# Patient Record
Sex: FEMALE | Race: WHITE | NOT HISPANIC OR LATINO | Employment: OTHER | URBAN - METROPOLITAN AREA
[De-identification: names, ages, dates, MRNs, and addresses within clinical notes are randomized per-mention and may not be internally consistent; named-entity substitution may affect disease eponyms.]

---

## 2018-10-05 ENCOUNTER — OFFICE VISIT (OUTPATIENT)
Dept: URGENT CARE | Facility: URGENT CARE | Age: 83
End: 2018-10-05
Payer: MEDICARE

## 2018-10-05 VITALS
HEART RATE: 79 BPM | WEIGHT: 168.9 LBS | OXYGEN SATURATION: 95 % | RESPIRATION RATE: 16 BRPM | SYSTOLIC BLOOD PRESSURE: 170 MMHG | TEMPERATURE: 97.7 F | DIASTOLIC BLOOD PRESSURE: 88 MMHG

## 2018-10-05 DIAGNOSIS — S69.91XA THUMB INJURY, RIGHT, INITIAL ENCOUNTER: Primary | ICD-10-CM

## 2018-10-05 PROCEDURE — 99203 OFFICE O/P NEW LOW 30 MIN: CPT | Performed by: NURSE PRACTITIONER

## 2018-10-05 RX ORDER — MULTIPLE VITAMINS W/ MINERALS TAB 9MG-400MCG
1 TAB ORAL DAILY
COMMUNITY

## 2018-10-05 ASSESSMENT — ENCOUNTER SYMPTOMS
TINGLING: 0
BRUISES/BLEEDS EASILY: 1
FALLS: 1

## 2018-10-05 NOTE — MR AVS SNAPSHOT
After Visit Summary   10/5/2018    Preethi Perkins    MRN: [id number]           Patient Information     Date Of Birth          1/20/1934        Visit Information        Provider Department      10/5/2018 5:10 PM Joselin Simmons NP [facility]        Today's Diagnoses     Thumb injury, right, initial encounter    -  1       Follow-ups after your visit        Who to contact     If you have questions or need follow up information about today's clinic visit or your schedule please contact [facility] directly at [phone].  Normal or non-critical lab and imaging results will be communicated to you by MyChart, letter or phone within 4 business days after the clinic has received the results. If you do not hear from us within 7 days, please contact the clinic through MyChart or phone. If you have a critical or abnormal lab result, we will notify you by phone as soon as possible.  Submit refill requests through [employer] or call your pharmacy and they will forward the refill request to us. Please allow 3 business days for your refill to be completed.          Additional Information About Your Visit        Care EveryWhere ID     This is your Care EveryWhere ID. This could be used by other organizations to access your [facility] medical records  [id number]        Your Vitals Were     Pulse Temperature Respirations Pulse Oximetry          79 97.7  F (36.5  C) (Oral) 16 95%         Blood Pressure from Last 3 Encounters:   10/05/18 170/88    Weight from Last 3 Encounters:   10/05/18 168 lb 14.4 oz (76.6 kg)              Today, you had the following     No orders found for display       Primary Care Provider Office Phone # Fax #    Rachel Wilkerson [phone]       [facility] [place] [address]  [place] [address]        Equal Access to Services     HUI BAUER AH: rosalia Roman,  delfin carrollalbubba ramoslakeisha micheal kd harris ah. So [facility] [phone].    ATENCIÓN: Si habla jonelle, tiene a mckenzie disposición servicios gratuitos de asistencia lingüística. Ilir al [phone].    We comply with applicable federal civil rights laws and [place] laws. We do not discriminate on the basis of race, color, national origin, age, disability, sex, sexual orientation, or gender identity.            Thank you!     Thank you for choosing [facility]  for your care. Our goal is always to provide you with excellent care. Hearing back from our patients is one way we can continue to improve our services. Please take a few minutes to complete the written survey that you may receive in the mail after your visit with us. Thank you!             Your Updated Medication List - Protect others around you: Learn how to safely use, store and throw away your medicines at www.disposemymeds.org.          This list is accurate as of 10/5/18  6:27 PM.  Always use your most recent med list.                   Brand Name Dispense Instructions for use Diagnosis    ASPIRIN PO           ATORVASTATIN CALCIUM PO           CO Q 10 PO           COZAAR PO           LASIX PO           Multi-vitamin Tabs tablet      Take 1 tablet by mouth daily        PLAVIX PO           TOPROL XL PO

## 2018-10-05 NOTE — PROGRESS NOTES
SUBJECTIVE:                                                    Preethi Perkins is a 84 year old female who presents to clinic today for the following health issues:    HPI  Right thumb injury at approx. 1pm this afternoon. Pt fell and jammed thumb into the ground. Immediate pin and bleeding. Cleansed and wrapped. Pain is less when unwrapped. Continued bleeding underneath the nail at this time. Pt is on anticoagulant. Denies numbness or tingling.    Problem list and histories reviewed & adjusted, as indicated.    Past Medical History:   Diagnosis Date     Heart disease      Hypertension        There is no problem list on file for this patient.    History reviewed. No pertinent surgical history.    Social History   Substance Use Topics     Smoking status: Never Smoker     Smokeless tobacco: Never Used     Alcohol use Not on file     History reviewed. No pertinent family history.      Current Outpatient Prescriptions   Medication Sig Dispense Refill     ASPIRIN PO        ATORVASTATIN CALCIUM PO        Clopidogrel Bisulfate (PLAVIX PO)        Coenzyme Q10 (CO Q 10 PO)        Furosemide (LASIX PO)        Losartan Potassium (COZAAR PO)        Metoprolol Succinate (TOPROL XL PO)        multivitamin, therapeutic with minerals (MULTI-VITAMIN) TABS tablet Take 1 tablet by mouth daily       No Known Allergies    Review of Systems   Musculoskeletal: Positive for falls and joint pain.   Neurological: Negative for tingling.   Endo/Heme/Allergies: Bruises/bleeds easily.         OBJECTIVE:     /88  Pulse 79  Temp 97.7  F (36.5  C) (Oral)  Resp 16  Wt 168 lb 14.4 oz (76.6 kg)  SpO2 95%  There is no height or weight on file to calculate BMI.  Physical Exam   Constitutional: No distress.   Musculoskeletal:        Right hand: She exhibits tenderness, bony tenderness and swelling.        Hands:  Neurological: She is alert.   Psychiatric: She has a normal mood and affect. Her behavior is normal.         Diagnostic Test  Results:  none     ASSESSMENT/PLAN:       ICD-10-CM    1. Thumb injury, right, initial encounter S69.91XA        Medical Decision Making:    Differential Diagnosis:  Partial nail avulsion, damage to nail bed, fracture,     Serious Comorbid Conditions:  Adult:  Anticoagulation    PLAN:    Pt referred to ED for treatment due to anticoagulation and inability to fully evaluate extend of damage.    Followup:    If not improving or if condition worsens, follow up with your Primary Care Provider, Transfer to ED via private car    BABAK Ramirez, ARNP, FNP-C  [facility]

## 2022-06-04 ENCOUNTER — TELEPHONE ENCOUNTER (OUTPATIENT)
Dept: URBAN - METROPOLITAN AREA CLINIC 68 | Facility: CLINIC | Age: 87
End: 2022-06-04

## 2022-06-05 ENCOUNTER — TELEPHONE ENCOUNTER (OUTPATIENT)
Dept: URBAN - METROPOLITAN AREA CLINIC 68 | Facility: CLINIC | Age: 87
End: 2022-06-05

## 2022-06-05 RX ORDER — ATORVASTATIN CALCIUM 40 MG/1
ATORVASTATIN CALCIUM( 40MG ORAL  DAILY ) ACTIVE -HX ENTRY TABLET, FILM COATED ORAL DAILY
Status: ACTIVE | COMMUNITY
Start: 2016-03-08

## 2022-06-05 RX ORDER — METOPROLOL SUCCINATE 25 MG/1
METOPROLOL SUCCINATE ER( 25MG ORAL  DAILY ) ACTIVE -HX ENTRY TABLET, EXTENDED RELEASE ORAL DAILY
Status: ACTIVE | COMMUNITY
Start: 2016-03-08

## 2022-06-05 RX ORDER — CLOPIDOGREL 75 MG/1
CLOPIDOGREL BISULFATE( 75MG ORAL  DAILY ) ACTIVE -HX ENTRY TABLET ORAL DAILY
Status: ACTIVE | COMMUNITY
Start: 2016-03-08

## 2022-06-05 RX ORDER — FUROSEMIDE 20 MG/1
FUROSEMIDE( 20MG ORAL  DAILY ) ACTIVE -HX ENTRY TABLET ORAL DAILY
Status: ACTIVE | COMMUNITY
Start: 2016-03-08

## 2022-06-05 RX ORDER — LOSARTAN POTASSIUM 25 MG/1
LOSARTAN POTASSIUM( 25MG ORAL  DAILY ) ACTIVE -HX ENTRY TABLET ORAL DAILY
Status: ACTIVE | COMMUNITY
Start: 2016-03-08

## 2022-06-25 ENCOUNTER — TELEPHONE ENCOUNTER (OUTPATIENT)
Age: 87
End: 2022-06-25

## 2022-06-25 RX ORDER — SODIUM SULFATE, POTASSIUM SULFATE, MAGNESIUM SULFATE 17.5; 3.13; 1.6 G/ML; G/ML; G/ML
SOLUTION, CONCENTRATE ORAL AS DIRECTED
Qty: 1 | Refills: 0 | OUTPATIENT
Start: 2015-11-20 | End: 2015-11-21

## 2022-06-26 ENCOUNTER — TELEPHONE ENCOUNTER (OUTPATIENT)
Age: 87
End: 2022-06-26

## 2022-06-26 RX ORDER — FUROSEMIDE 20 MG/1
FUROSEMIDE( 20MG ORAL  DAILY ) ACTIVE -HX ENTRY TABLET ORAL DAILY
Status: ACTIVE | COMMUNITY
Start: 2016-03-08

## 2022-06-26 RX ORDER — ATORVASTATIN CALCIUM 40 MG/1
ATORVASTATIN CALCIUM( 40MG ORAL  DAILY ) ACTIVE -HX ENTRY TABLET, FILM COATED ORAL DAILY
Status: ACTIVE | COMMUNITY
Start: 2016-03-08

## 2022-06-26 RX ORDER — LOSARTAN POTASSIUM 25 MG/1
LOSARTAN POTASSIUM( 25MG ORAL  DAILY ) ACTIVE -HX ENTRY TABLET, FILM COATED ORAL DAILY
Status: ACTIVE | COMMUNITY
Start: 2016-03-08

## 2022-06-26 RX ORDER — UBIDECARENONE/VIT E ACET 100MG-5
CO Q 10( 100MG ORAL  DAILY ) ACTIVE -HX ENTRY CAPSULE ORAL DAILY
Status: ACTIVE | COMMUNITY
Start: 2016-03-08

## 2022-06-26 RX ORDER — CLOPIDOGREL BISULFATE 75 MG/1
CLOPIDOGREL BISULFATE( 75MG ORAL  DAILY ) ACTIVE -HX ENTRY TABLET, FILM COATED ORAL DAILY
Status: ACTIVE | COMMUNITY
Start: 2016-03-08

## 2022-06-26 RX ORDER — METOPROLOL SUCCINATE 25 MG/1
METOPROLOL SUCCINATE ER( 25MG ORAL  DAILY ) ACTIVE -HX ENTRY TABLET, EXTENDED RELEASE ORAL DAILY
Status: ACTIVE | COMMUNITY
Start: 2016-03-08

## 2022-06-26 RX ORDER — ASPIRIN 81 MG/1
LOW-DOSE ASPIRIN( 81MG ORAL  DAILY ) ACTIVE -HX ENTRY TABLET, COATED ORAL DAILY
Status: ACTIVE | COMMUNITY
Start: 2016-03-08

## 2023-01-01 ENCOUNTER — OFFICE VISIT (OUTPATIENT)
Dept: URGENT CARE | Facility: URGENT CARE | Age: 88
End: 2023-01-01
Payer: MEDICARE

## 2023-01-01 DIAGNOSIS — Z48.02 VISIT FOR SUTURE REMOVAL: Primary | ICD-10-CM

## 2023-01-01 PROCEDURE — 99207 PR NO CHARGE NURSE ONLY: CPT

## 2023-10-02 NOTE — PROGRESS NOTES
Suture removal:   Date sutures applied: 9/22/23         Where (setting) in which they applied:Urgent Care visit  Description:  Type: sutures  Location: scalp  History:    Cause of laceration:  fall  Accompanying Signs & Symptoms: (staff: if yes-describe)  Redness: No  Warmth: No  Drainage: No  Still bleeding: No  Fevers: No  Last tetanus shot: last tetanus booster within 10 years  Kary Melton CMA on 10/2/2023 at 1:55 PM